# Patient Record
Sex: MALE | Race: WHITE | Employment: FULL TIME | URBAN - METROPOLITAN AREA
[De-identification: names, ages, dates, MRNs, and addresses within clinical notes are randomized per-mention and may not be internally consistent; named-entity substitution may affect disease eponyms.]

---

## 2019-01-28 ENCOUNTER — HOSPITAL ENCOUNTER (OUTPATIENT)
Dept: PHYSICAL THERAPY | Age: 54
Discharge: HOME | End: 2019-01-28
Payer: COMMERCIAL

## 2019-01-28 PROCEDURE — 97110 THERAPEUTIC EXERCISES: CPT

## 2019-01-28 PROCEDURE — 97162 PT EVAL MOD COMPLEX 30 MIN: CPT

## 2019-01-28 PROCEDURE — 97140 MANUAL THERAPY 1/> REGIONS: CPT

## 2019-01-28 NOTE — PROGRESS NOTES
[facility] [address] [facility] [place], [address] [place]  
[phone] fax Plan of Care/ Statement of Necessity for Physical Therapy Services Patient name: Russ Anglin Start of Care: 2019 Referral source: Carollee Ahumada, MD : 1965 Medical Diagnosis: Lumbar pain [M54.5] Payor: Bj Culp / Plan: [address] [place] [address] [place] PPO / Product Type: PPO /  Onset Date:chronic, last flare up May 2018 Treatment Diagnosis: left lumbar radiculopathy with impaired sciatic nerve mobility Prior Hospitalization: see medical history Provider#: [id number] Medications: Verified on Patient summary List  
 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 Assessment/ key information:  
 Pt is a 49 yo male with complaints of left sided back pain accompanied with LE radicular symptoms. Notes a chronic history of back pain with the most recent flare up occurring back in may of 2018. Notes he has tried chiropractic  and massage care following initial injury and reports only short term relief. Notes he went to his Dr in December where he received a cortisone shot with no ease in symptoms. Pt notes he initially injured his back 15 years ago while lifting a heavy box while twisting his spine to place the object on a shelf. Notes since then he has been getting flare ups a few times a year. Pt notes his pain normally goes away on it's own however after this last flare up he has been unable to ease the pain. Notes pain is constant, dull, achy with episodes of sharp pain with unspecified spinal motions.  Notes pain increases with bending forward, prolonged sitting, standing, prolonged walking, lifting, and also reports increased pain with coughing and sneezing. Pt notes a reproduction of left posterior LE radicular symptoms when his back is bothering him more. Pt notes pain is eased some with over the counter pain medication, stretching and when using heat. Pt denies any B LE N/T, LOB during ambulation, weakness/heaviness in legs or any changes in bowl/bladder function. Pt presents with S/S consistent  left lumbar radiculopathy accompanied by impaired sciatic nerve mobility. Upon examination pt presents with decreased left sciatic nerve mobility, decreased lumbar AROM into extension and left rotation with a reproduction of pain and LE radicular symptoms, fair core stabilization/control, and myofascial restrictions of B LE musculature ( left> right). Due to impairments listed above pt is having difficulty performing all household and community ADLs. . Patient will continue to benefit from skilled PT services to modify and progress therapeutic interventions, address functional mobility deficits, address ROM deficits, address strength deficits, analyze and address soft tissue restrictions, analyze and cue movement patterns, analyze and modify body mechanics/ergonomics, assess and modify postural abnormalities and instruct in home and community integration to attain remaining goals Physical Therapy Evaluation - Lumbar Spine ([facility]) General Health:  Red Flags Indicated? [] Yes    [x] No 
[] Yes     [x] No       Recent weight change (If yes, due to dieting? [] Yes  [] No) [] Yes     [x] No       Weakness in legs during walking 
[] Yes     [x] No       Unremitting pain at night 
[] Yes     [x] No       Abdominal pain or problems 
[] Yes     [x] No       Rectal bleeding 
[] Yes     [x] No       Feet more cold or painful in cold weather 
[] Yes     [x] No       Menstrual irregularities 
[] Yes     [x] No       Blood or pain with urination 
[] Yes     [x] No       Dysfunction of bowel or bladder [] Yes     [x] No       Recent illness within past 3 weeks (i.e, cold, flu) 
[] Yes     [x] No       Numbness/tingling in buttock/genitalia region 
  
Past History/Treatments:  
  
Diagnostic Tests:      [] Lab work        [x] X-rays    [] CT   [] MRI     [] Other[de-identified] 
  
OBJECTIVE Posture: 
Lateral Shift:    [] R    [] L             [] +  [] - 
Kyphosis:        [x] Increased       [] Decreased   []  WNL Lordosis:         [] Increased       [x] Decreased   [] WNL Pelvic symmetry: [] WNL          [] Other: 
  
Gait:                [x] Normal           [] Abnormal: 
  
Active Movements: [] N/A   [] Too acute   [] Other: ROM % AROM % PROM Comments:pain, area Forward flexion 40-60 100   Eases pain Extension 20-30 50   Reproduction of lower back pain. Reproduction of left LE radicular symptoms SB right 20-30 100   NE  
SB left 20-30 100   NE Rotation right 5-10 100   NE Rotation left 5-10 60   Reproduction of lower back pain. Reproduction of left LE radicular symptoms  
  
Repeated Movements Effects on present pain: produces (GA), abolishes (A), increases (incr), decreases (decr), centralizes (C), peripheral (PH), no effect (NE) 
  Pre-Test Sx Flexion Repeated Flexion Extension Repeated Extension Repeated SBL Repeated SBR Sitting                
Standing     C   PH      
Lying           N/A N/A  
  
Thoracic spine: hypomobile grossly 50% and decreased joint mobility noted Neuro Screen [x] WNL Myotome/Dermatome/Reflexes: 
Comments: 
  
Dural Mobility: SLR Sitting:     [x] R    [x] L    [] +    [x] -  @ (degrees):  
        Supine:    [x] R    [x] L    [] +    [x] -  @ (degrees):  
Slump Test:     [] R    [x] L    [x] +    [] -  @ (degrees): Prone Knee Bend:      [x] R    [x] L    [] +    [x] -  
  
Palpation [] Min  [x] Mod  [] Severe    Location: B lumbar paraspinals 
  
  
Strength 
  L(0-5) R (0-5) N/T Hip Flexion (L1,2) 5 5 []   
Knee Extension (L3,4) 5 5 []   
Ankle Dorsiflexion (L4) 5 5 []   
 Great Toe Extension (L5)     [x]   
Ankle Plantarflexion (S1)     []   
Knee Flexion (S1,2) 5 5 []   
Upper Abdominals     []   
Lower Abdominals     []   
Paraspinals     []   
Back Rotators     []   
Gluteus Nicolás 4- 4- []   
Other: Hip abduction ( TFL compression ) 4 4 []   
  
Special Tests Lumbar:          Lumb. Compression:   [x] Pos  [] Neg                                            
                        Lumbar Distraction:     [] Pos  [x] Neg 
                        Quadrant:                    [] Pos  [] Neg   [] Flex  [x] Ext - 
  
      
  
     Deficits:     Geno's: [] R    [] L    [] +    [x] - Bandar:          [] R    [] L    [] +    [x] - Hamstrings 90/90:moderate restriction B Gastrocsoleus (to neutral): Right: moderate restriction         Left:moderate restriction Global Muscular Weakness: 
Core control: fair: lost PPT at third rep of double leg lowering. 
  
 
 
 
Evaluation Complexity History MEDIUM  Complexity : 1-2 comorbidities / personal factors will impact the outcome/ POC ; Examination MEDIUM Complexity : 3 Standardized tests and measures addressing body structure, function, activity limitation and / or participation in recreation  ;Presentation MEDIUM Complexity : Evolving with changing characteristics  ; Clinical Decision Making MEDIUM Complexity : FOTO score of 26-74 Overall Complexity Rating: MEDIUM Problem List: pain affecting function, decrease ROM, decrease strength, decrease ADL/ functional abilitiies, decrease activity tolerance and decrease flexibility/ joint mobility Treatment Plan may include any combination of the following: Therapeutic exercise, Therapeutic activities, Neuromuscular re-education, Physical agent/modality, Manual therapy, Patient education, Self Care training and Functional mobility training Patient / Family readiness to learn indicated by: asking questions Persons(s) to be included in education: patient (P) Barriers to Learning/Limitations: None Patient Goal (s): decreae pain Patient Self Reported Health Status: good Rehabilitation Potential: good Short term goals to be achieved in 1 week 1) Pt will report full compliance with HEP in order to better self manage pain at home 
  
Long term goals to be achieved in 6 weeks 1) Pt will demonstrate with increased lumbar extension and left rotation to >/= WNL to increase ease with lifting. 2) Pt will demonstrate with an increase in core stability by being able to maintain a PPT while performing double leg lowering for 10 reps. 3) Pt will demonstrate an increase in B glute strength to >/=4+/5 to increase ease with community ambulation. 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 Frequency / Duration: Patient to be seen 2 times per week for 6 weeks. Patient/ Caregiver education and instruction: Diagnosis, prognosis, self care, activity modification and exercises 
 [x]  Plan of care has been reviewed with YOVANI Low 1/28/2019 2:16 PM 
________________________________________________________________________ I certify that the above Therapy Services are being furnished while the patient is under my care. I agree with the treatment plan and certify that this therapy is necessary. [de-identified] Signature:____________Date:_________TIME:________ 
 
[employer], Date and Time must be completed for valid certification ** Please sign and return to In [facility] [address] [facility], [address] [place]  
[phone] fax

## 2019-01-28 NOTE — PROGRESS NOTES
PT DAILY TREATMENT NOTE/LUMBAR EVAL 10-18 Patient Name: Peg Davidson Date:2019 : 1965 [x]  Patient  Verified Payor: Rosio Wakefield / [place]: [place] [employer] [place] / Product Type: PPO / In time:1200  Out time:100 Total Treatment Time (min): 60 Visit #: 1 of 12 Treatment Area: Lumbar pain [M54.5] SUBJECTIVE Pain Level (0-10 scale): 2 [x]constant []intermittent [x]improving []worsening []no change since onset Any medication changes, allergies to medications, adverse drug reactions, diagnosis change, or new procedure performed?: [x] No    [] Yes (see summary sheet for update) Subjective functional status/changes:    
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 
 
 
20 min [x]Eval                  []Re-Eval  
 
 
30 min Therapeutic Exercise:  [x] See flow sheet :  
Rationale: increase ROM, increase strength and improve coordination to improve the patients ability to increase ease ease ADls 10 min Manual Therapy:  Lumbar traction, manuals HS stretch Rationale: decrease pain, increase ROM and increase tissue extensibility to increase ease ease with work tasks With 
 [] TE 
 [] TA 
 [] neuro 
 [] other: Patient Education: [x] Review HEP [] Progressed/Changed HEP based on:  
[] positioning   [] body mechanics   [] transfers   [] heat/ice application   
[] other:   
 
 
 
Physical Therapy Evaluation - Lumbar Spine ([facility]) General Health:  Red Flags Indicated? [] Yes    [x] No 
[] Yes [x] No Recent weight change (If yes, due to dieting? [] Yes  [] No) [] Yes [x] No Weakness in legs during walking 
[] Yes [x] No Unremitting pain at night 
[] Yes [x] No Abdominal pain or problems 
[] Yes [x] No Rectal bleeding [] Yes [x] No Feet more cold or painful in cold weather 
[] Yes [x] No Menstrual irregularities 
[] Yes [x] No Blood or pain with urination 
[] Yes [x] No Dysfunction of bowel or bladder 
[] Yes [x] No Recent illness within past 3 weeks (i.e, cold, flu) 
[] Yes [x] No Numbness/tingling in buttock/genitalia region Past History/Treatments:  
 
Diagnostic Tests: [] Lab work [x] X-rays    [] CT [] MRI     [] Other[de-identified] 
 
OBJECTIVEPosture: 
Lateral Shift: [] R    [] L     [] +  [] - 
Kyphosis: [x] Increased [] Decreased   []  WNL Lordosis:  [] Increased [x] Decreased   [] WNL Pelvic symmetry: [] WNL    [] Other: 
 
Gait:  [x] Normal     [] Abnormal: 
 
Active Movements: [] N/A   [] Too acute   [] Other: ROM % AROM % PROM Comments:pain, area Forward flexion 40-60 100  Eases pain Extension 20-30 50  Reproduction of lower back pain. Reproduction of left LE radicular symptoms SB right 20-30 100  NE  
SB left 20-30 100  NE Rotation right 5-10 100  NE Rotation left 5-10 60  Reproduction of lower back pain. Reproduction of left LE radicular symptoms Repeated Movements Effects on present pain: produces (ID), abolishes (A), increases (incr), decreases (decr), centralizes (C), peripheral (PH), no effect (NE) Pre-Test Sx Flexion Repeated Flexion Extension Repeated Extension Repeated SBL Repeated SBR Sitting Standing   C  PH Lying      N/A N/A Thoracic spine: hypomobile grossly 50% and decreased joint mobility noted Neuro Screen [x] WNL Myotome/Dermatome/Reflexes: 
Comments: 
 
Dural Mobility: SLR Sitting: [x] R    [x] L    [] +    [x] -  @ (degrees):  
        Supine: [x] R    [x] L    [] +    [x] -  @ (degrees):  
Slump Test: [] R    [x] L    [x] +    [] -  @ (degrees): Prone Knee Bend: [x] R    [x] L    [] +    [x] - Palpation [] Min  [x] Mod  [] Severe    Location: B lumbar paraspinals Strength 
 L(0-5) R (0-5) N/T Hip Flexion (L1,2) 5 5 [] Knee Extension (L3,4) 5 5 [] Ankle Dorsiflexion (L4) 5 5 [] Great Toe Extension (L5)   [x] Ankle Plantarflexion (S1)   [] Knee Flexion (S1,2) 5 5 [] Upper Abdominals   [] Lower Abdominals   [] Paraspinals   [] Back Rotators   [] Gluteus Nicolás 4- 4- [] Other: Hip abduction ( TFL compression ) 4 4 [] Special Tests Lumbar:  Lumb. Compression: [x] Pos  [] Neg            
  Lumbar Distraction:   [] Pos  [x] Neg 
  Quadrant:  [] Pos  [] Neg   [] Flex  [x] Ext 
- Deficits: Geno's: [] R    [] L    [] +    [x] - Bandar: [] R    [] L    [] +    [x] - Hamstrings 90/90:moderate restriction B Gastrocsoleus (to neutral): Right: moderate restriction  Left:moderate restriction Global Muscular Weakness: 
Core control: fair: lost PPT at third rep of double leg lowering. Other tests/comments: 
  
 
Pain Level (0-10 scale) post treatment: 0 
 
ASSESSMENT/Changes in Function: Pt is a 47 yo male with complaints of left sided back pain accompanied with LE radicular symptoms. Notes a chronic history of back pain with the most recent flare up occurring back in may of 2018. Notes he has tried chiropractic  and massage care following initial injury and reports only short term relief. Notes he went to his Dr in December where he received a cortisone shot with no ease in symptoms. Pt notes he initially injured his back 15 years ago while lifting a heavy box while twisting his spine to place the object on a shelf. Notes since then he has been getting flare ups a few times a year. Pt notes his pain normally goes away on it's own however after this last flare up he has been unable to ease the pain. Notes pain is constant, dull, achy with episodes of sharp pain with unspecified spinal motions.  Notes pain increases with bending forward, prolonged sitting, standing, prolonged walking, lifting, and also reports increased pain with coughing and sneezing. Pt notes a reproduction of left posterior LE radicular symptoms when his back is bothering him more. Pt notes pain is eased some with over the counter pain medication, stretching and when using heat. Pt denies any B LE N/T, LOB during ambulation, weakness/heaviness in legs or any changes in bowl/bladder function. Pt presents with S/S consistent  left lumbar radiculopathy accompanied by impaired sciatic nerve mobility. Upon examination pt presents with decreased left sciatic nerve mobility, decreased lumbar AROM into extension and left rotation with a reproduction of pain and LE radicular symptoms, fair core stabilization/control, and myofascial restrictions of B LE musculature ( left> right). Due to impairments listed above pt is having difficulty performing all household and community ADLs. . Patient will continue to benefit from skilled PT services to modify and progress therapeutic interventions, address functional mobility deficits, address ROM deficits, address strength deficits, analyze and address soft tissue restrictions, analyze and cue movement patterns, analyze and modify body mechanics/ergonomics, assess and modify postural abnormalities and instruct in home and community integration to attain remaining goals. [x]  See Plan of Care 
[]  See progress note/recertification 
[]  See Discharge Summary Progress towards goals / Updated goals: 
Short term goals to be achieved in 1 week 1) Pt will report full compliance with HEP in order to better self manage pain at home At [facility]: reviewed and handed out HEP Long term goals to be achieved in 6 weeks 1) Pt will demonstrate with increased lumbar extension and left rotation to >/= WNL to increase ease with lifting. At [facility]: extension= 50%, left rotation= 60% 2) Pt will demonstrate with an increase in core stability by being able to maintain a PPT while performing double leg lowering for 10 reps. At [facility] lost form during third rep. 3) Pt will demonstrate an increase in B glute strength to >/=4+/5 to increase ease with community ambulation. At eval: B glute strength: 4-/5, B hip abduction strength 4/5   
4) Pt will have an improved FOTO score to >/=67 to increase ease function At eval:FOTO=56  
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 
[]  Update interventions per flow sheet      
[]  Discharge due to:_ 
[]  Other:_ Tamiko Low 1/28/2019  12:01 PM

## 2019-01-29 ENCOUNTER — HOSPITAL ENCOUNTER (OUTPATIENT)
Dept: PHYSICAL THERAPY | Age: 54
End: 2019-01-29
Payer: COMMERCIAL

## 2019-01-30 ENCOUNTER — HOSPITAL ENCOUNTER (OUTPATIENT)
Dept: PHYSICAL THERAPY | Age: 54
Discharge: HOME | End: 2019-01-30
Payer: COMMERCIAL

## 2019-01-30 PROCEDURE — 97140 MANUAL THERAPY 1/> REGIONS: CPT

## 2019-01-30 PROCEDURE — 97110 THERAPEUTIC EXERCISES: CPT

## 2019-01-30 NOTE — PROGRESS NOTES
PT DAILY TREATMENT NOTE 10-18 Patient Name: Raissa Toledo Date:2019 : 1965 [x]  Patient  Verified Payor: Melecio Macias / [place] [employer] [place] / Product Type: PPO / In time: 452 Out time:542 Total Treatment Time (min): 50 Visit #: 2 of 12 Treatment Area: Lumbar pain [M54.5] SUBJECTIVE Pain Level (0-10 scale): 1 Any medication changes, allergies to medications, adverse drug reactions, diagnosis change, or new procedure performed?: [x] No    [] Yes (see summary sheet for update) Subjective functional status/changes:   [] No changes reported Pt notes minimal pain today. Reports initiation of HEP OBJECTIVE 44 min Therapeutic Exercise:  [x] See flow sheet :  
Rationale: increase ROM and increase strength to improve the patients ability to increase ease with community mobility 8 min Manual Therapy:  Lumbar traction, manual HS stretch Rationale: decrease pain, increase ROM and increase tissue extensibility to increase ease with ADls With 
 [] TE 
 [] TA 
 [] neuro 
 [] other: Patient Education: [x] Review HEP [] Progressed/Changed HEP based on:  
[] positioning   [] body mechanics   [] transfers   [] heat/ice application   
[] other:   
 
  
 
Pain Level (0-10 scale) post treatment: 0 
 
ASSESSMENT/Changes in Function: Initiated exercises per POC. Pt reports no pain at the end of today's treatment. Patient will continue to benefit from skilled PT services to modify and progress therapeutic interventions, address functional mobility deficits, address ROM deficits, address strength deficits, analyze and address soft tissue restrictions, analyze and cue movement patterns, analyze and modify body mechanics/ergonomics, assess and modify postural abnormalities and instruct in home and community integration to attain remaining goals. [x]  See Plan of Care 
[]  See progress note/recertification 
[]  See Discharge Summary Progress towards goals / Updated goals: 
Short term goals to be achieved in 1 week 1) Pt will report full compliance with HEP in order to better self manage pain at home At eval: reviewed and handed out HEP 
  
Long term goals to be achieved in 6 weeks 1) Pt will demonstrate with increased lumbar extension and left rotation to >/= WNL to increase ease with lifting. At eval: extension= 50%, left rotation= 60% 2) Pt will demonstrate with an increase in core stability by being able to maintain a PPT while performing double leg lowering for 10 reps. At eval lost form during third rep. 3) Pt will demonstrate an increase in B glute strength to >/=4+/5 to increase ease with community ambulation. At [facility]: B glute strength: 4-/5, B hip abduction strength 4/5   
4) Pt will have an improved FOTO score to >/=67 to increase ease function At [facility]:FOTO=56  
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 
[]  Update interventions per flow sheet      
[]  Discharge due to:_ 
[]  Other:_ Monet Campos 1/30/2019  5:15 PM 
 
Future Appointments Date Time Provider Daylin Sherwood 2/5/2019  6:00 PM Olu Rai PTA [facility]  
2/6/2019  6:00 PM Anna Low [facility]  
2/12/2019  6:00 PM Anna Low [facility]  
2/13/2019  6:00 PM Anna Low [facility]  
2/19/2019  6:00 PM Anna Low [facility]  
2/21/2019  5:00 PM Baldo Armstrong [facility]  
2/25/2019  5:30 PM [facility]  
2/27/2019  6:00 PM Anna Low [facility]  
3/4/2019  6:00 PM Tamiko Low [facility]  
3/6/2019  6:00 PM [facility]

## 2019-02-05 ENCOUNTER — HOSPITAL ENCOUNTER (OUTPATIENT)
Dept: PHYSICAL THERAPY | Age: 54
Discharge: HOME | End: 2019-02-05
Payer: COMMERCIAL

## 2019-02-05 PROCEDURE — 97110 THERAPEUTIC EXERCISES: CPT

## 2019-02-05 PROCEDURE — 97140 MANUAL THERAPY 1/> REGIONS: CPT

## 2019-02-05 NOTE — PROGRESS NOTES
PT DAILY TREATMENT NOTE 10-18 Patient Name: Mathew Paiz Date:2019 : 1965 [x]  Patient  Verified Payor: Sahra Nazario / [place]: [place] [employer] / Product Type: PPO / In time:540  Out time:630 Total Treatment Time (min): 50 Visit #: 3 of 12 Treatment Area: Lumbar pain [M54.5] SUBJECTIVE Pain Level (0-10 scale): 0-1 Any medication changes, allergies to medications, adverse drug reactions, diagnosis change, or new procedure performed?: [x] No    [] Yes (see summary sheet for update) Subjective functional status/changes:   [] No changes reported Pt reports minimal pain today. Has no new complaints at this time OBJECTIVE 42 min Therapeutic Exercise:  [x] See flow sheet :  
Rationale: increase ROM, increase strength and improve coordination to improve the patients ability to increase ease with functional tasks 8 min Manual Therapy:  Lumbar traction, manual HS stretch Rationale: decrease pain and increase ROM to increase ease with ADLs With 
 [] TE 
 [] TA 
 [] neuro 
 [] other: Patient Education: [x] Review HEP [] Progressed/Changed HEP based on:  
[] positioning   [] body mechanics   [] transfers   [] heat/ice application   
[] other:   
 
  
 
Pain Level (0-10 scale) post treatment: 0 
 
ASSESSMENT/Changes in Function: Progressed pt in more core strengthening and stability exercises . Pt had no complaints of increased pain. Patient will continue to benefit from skilled PT services to modify and progress therapeutic interventions, address functional mobility deficits, address ROM deficits, address strength deficits, analyze and address soft tissue restrictions, analyze and cue movement patterns, analyze and modify body mechanics/ergonomics, assess and modify postural abnormalities, address imbalance/dizziness and instruct in home and community integration to attain remaining goals. [x]  See Plan of Care 
[]  See progress note/recertification []  See Discharge Summary Progress towards goals / Updated goals: 
Short term goals to be achieved in 1 week 1) Pt will report full compliance with HEP in order to better self manage pain at home At [facility]: reviewed and handed out HEP Current: Progressing, reports initiation of HEP, does not report full compliance, 2/4/19 
  
Long term goals to be achieved in 6 weeks 1) Pt will demonstrate with increased lumbar extension and left rotation to >/= WNL to increase ease with lifting. At eval: extension= 50%, left rotation= 60% 2) Pt will demonstrate with an increase in core stability by being able to maintain a PPT while performing double leg lowering for 10 reps. At eval lost form during third rep. 3) Pt will demonstrate an increase in B glute strength to >/=4+/5 to increase ease with community ambulation. At [facility]: B glute strength: 4-/5, B hip abduction strength 4/5   
4) Pt will have an improved FOTO score to >/=67 to increase ease function At [facility]:FOTO=56  
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 
[]  Update interventions per flow sheet      
[]  Discharge due to:_ 
[]  Other:_ Irene Rolling 2/5/2019  5:41 PM 
 
Future Appointments Date Time Provider Daylin Sherwood 2/5/2019  6:00 PM Jaja Low [facility]  
2/6/2019  6:00 PM Tahira Low [facility]  
2/12/2019  6:00 PM Tahira Low [facility]  
2/13/2019  6:00 PM Tahira Low [facility]  
2/19/2019  6:00 PM Magnus Killian [facility]  
2/21/2019  5:00 PM Dimitri Contreras PT [facility]  
2/25/2019  5:30 PM Magnus Killian [facility]  
2/27/2019  6:00 PM Magnus Killian [facility]  
3/4/2019  6:00 PM Shima Low [facility]  
 3/6/2019  6:00 PM Beau Ladd [facility]

## 2019-02-06 ENCOUNTER — HOSPITAL ENCOUNTER (OUTPATIENT)
Dept: PHYSICAL THERAPY | Age: 54
Discharge: HOME | End: 2019-02-06
Payer: COMMERCIAL

## 2019-02-06 PROCEDURE — 97112 NEUROMUSCULAR REEDUCATION: CPT

## 2019-02-06 PROCEDURE — 97110 THERAPEUTIC EXERCISES: CPT

## 2019-02-06 NOTE — PROGRESS NOTES
PT DAILY TREATMENT NOTE 10-18 Patient Name: Jd Wells Date:2019 : 1965 [x]  Patient  Verified Payor: Kyle Backer / [place]: [place] [employer] [place] / Product Type: PPO / In time:530 Out time:630 Total Treatment Time (min): 60 Visit #: 4 of 12 Treatment Area: Lumbar pain [M54.5] SUBJECTIVE Pain Level (0-10 scale): 0.5 Any medication changes, allergies to medications, adverse drug reactions, diagnosis change, or new procedure performed?: [x] No    [] Yes (see summary sheet for update) Subjective functional status/changes:   [] No changes reported Pt reports that he continues to feel good. OBJECTIVE 20 min Therapeutic Exercise:  [x] See flow sheet :  
Rationale: increase ROM and increase strength to improve the patients ability to increase ease with  
  
40 min Neuromuscular Re-education:  [x]  See flow sheet :core activation exercises Rationale: increase strength, improve coordination and increase proprioception  to improve the patients ability to increase ease with recreational tasks With 
 [] TE 
 [] TA 
 [] neuro 
 [] other: Patient Education: [x] Review HEP [] Progressed/Changed HEP based on:  
[] positioning   [] body mechanics   [] transfers   [] heat/ice application   
[] other:   
 
  
 
Pain Level (0-10 scale) post treatment: 0 
 
ASSESSMENT/Changes in Function: Progressed pt to front and side planks at table. Pt continues to demonstrate with improved core stability and lumbo pelvic awareness. Patient will continue to benefit from skilled PT services to modify and progress therapeutic interventions, address functional mobility deficits, address ROM deficits, address strength deficits, analyze and address soft tissue restrictions, analyze and cue movement patterns, analyze and modify body mechanics/ergonomics, assess and modify postural abnormalities and instruct in home and community integration to attain remaining goals. [x]  See Plan of Care []  See progress note/recertification 
[]  See Discharge Summary Progress towards goals / Updated goals: 
Short term goals to be achieved in 1 week 1) Pt will report full compliance with HEP in order to better self manage pain at home At [facility]: reviewed and handed out HEP Current: Progressing, reports initiation of HEP, does not report full compliance, 2/4/19 
  
Long term goals to be achieved in 6 weeks 1) Pt will demonstrate with increased lumbar extension and left rotation to >/= WNL to increase ease with lifting. At [facility]: extension= 50%, left rotation= 60% 2) Pt will demonstrate with an increase in core stability by being able to maintain a PPT while performing double leg lowering for 10 reps. At [facility] lost form during third rep. 3) Pt will demonstrate an increase in B glute strength to >/=4+/5 to increase ease with community ambulation. At [facility]: B glute strength: 4-/5, B hip abduction strength 4/5   
4) Pt will have an improved FOTO score to >/=67 to increase ease function At [facility]:FOTO=56  
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 
[]  Update interventions per flow sheet      
[]  Discharge due to:_ 
[]  Other:_ Sincere Aguilar 2/6/2019  5:40 PM 
 
Future Appointments Date Time Provider Daylin Sherwood 2/6/2019  6:00 PM Jaja Low [facility]  
2/12/2019  6:00 PM Mino Low Delay [facility]  
2/13/2019  6:00 PM Mino Low [facility]  
2/19/2019  6:00 PM Abelardo Nixon [facility]  
2/21/2019  5:00 PM Duy Crain PT [facility]  
2[facility]25/2019  [facility]  
2/27/2019  6:00 PM Abelardo Nixon [facility]  
3/4/2019  6:00 PM Maranda [facility]  
 3/6/2019  6:00 PM Marianna Goldman [facility]

## 2019-02-12 ENCOUNTER — APPOINTMENT (OUTPATIENT)
Dept: PHYSICAL THERAPY | Age: 54
End: 2019-02-12
Payer: COMMERCIAL

## 2019-02-13 ENCOUNTER — HOSPITAL ENCOUNTER (OUTPATIENT)
Dept: PHYSICAL THERAPY | Age: 54
Discharge: HOME | End: 2019-02-13
Payer: COMMERCIAL

## 2019-02-13 PROCEDURE — 97110 THERAPEUTIC EXERCISES: CPT

## 2019-02-13 PROCEDURE — 97112 NEUROMUSCULAR REEDUCATION: CPT

## 2019-02-13 NOTE — PROGRESS NOTES
PT DAILY TREATMENT NOTE 10-18 Patient Name: Justina Lab Date:2019 : 1965 [x]  Patient  Verified Payor: Rachid Cardenas / Plan: [place] [employer] [place] / Product Type: PPO / In time:530 Out time:526 Total Treatment Time (min): 56 Visit #: 5 of 12 Treatment Area: Lumbar pain [M54.5] SUBJECTIVE Pain Level (0-10 scale): 1 Any medication changes, allergies to medications, adverse drug reactions, diagnosis change, or new procedure performed?: [x] No    [] Yes (see summary sheet for update) Subjective functional status/changes:   [] No changes reported Pt reports minimal pain today. OBJECTIVE 26 min Therapeutic Exercise:  [x] See flow sheet :  
Rationale: increase ROM and increase strength to improve the patients ability to increase ease with ADls 30 min Neuromuscular Re-education:  [x]  See flow sheet :core stability exercises Rationale: increase strength, improve coordination and increase proprioception  to improve the patients ability to increase ease with work tasks With 
 [] TE 
 [] TA 
 [] neuro 
 [] other: Patient Education: [x] Review HEP [] Progressed/Changed HEP based on:  
[] positioning   [] body mechanics   [] transfers   [] heat/ice application   
[] other:   
 
  
 
Pain Level (0-10 scale) post treatment: 0 
 
ASSESSMENT/Changes in Function: Pt continues to demonstrate with good tolerance to activity. Pt is being decreased to being seen 1 time a week. Patient will continue to benefit from skilled PT services to modify and progress therapeutic interventions, address functional mobility deficits, address ROM deficits, address strength deficits, analyze and address soft tissue restrictions, analyze and cue movement patterns, analyze and modify body mechanics/ergonomics, assess and modify postural abnormalities and instruct in home and community integration to attain remaining goals. [x]  See Plan of Care 
[]  See progress note/recertification []  See Discharge Summary Progress towards goals / Updated goals: 
Short term goals to be achieved in 1 week 1) Pt will report full compliance with HEP in order to better self manage pain at home At [facility]: reviewed and handed out HEP  
Current: Progressing, reports initiation of HEP, does not report full compliance, 2/4/19 
  
Long term goals to be achieved in 6 weeks 1) Pt will demonstrate with increased lumbar extension and left rotation to >/= WNL to increase ease with lifting. At eval: extension= 50%, left rotation= 60% 2) Pt will demonstrate with an increase in core stability by being able to maintain a PPT while performing double leg lowering for 10 reps. At eval lost form during third rep. 3) Pt will demonstrate an increase in B glute strength to >/=4+/5 to increase ease with community ambulation. At [facility]: B glute strength: 4-/5, B hip abduction strength 4/5   
4) Pt will have an improved FOTO score to >/=67 to increase ease function At [facility]:FOTO=56  
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 
[]  Update interventions per flow sheet      
[]  Discharge due to:_ 
[]  Other:_ Tino Waters 2/13/2019  5:37 PM 
 
Future Appointments Date Time Provider Daylin Sherwood 2/13/2019  6:00 PM Jaja Low [facility]  
2/19/2019  6:00 PM Geovanna Low [facility]  
2/27/2019  6:00 PM [facility]  
3/6/2019  6:00 PM Geovanna Low [facility]  
3/13/2019  6:00 PM Geovanna Low [facility]  
3/20/2019  6:00 PM Geovanna Low [facility]  
3/27/2019  6:00 PM Jaja Low [facility]  
4/3/2019  6:00 PM Yancy Low [facility]

## 2019-02-19 ENCOUNTER — HOSPITAL ENCOUNTER (OUTPATIENT)
Dept: PHYSICAL THERAPY | Age: 54
Discharge: HOME | End: 2019-02-19
Payer: COMMERCIAL

## 2019-02-19 PROCEDURE — 97110 THERAPEUTIC EXERCISES: CPT

## 2019-02-19 PROCEDURE — 97112 NEUROMUSCULAR REEDUCATION: CPT

## 2019-02-19 NOTE — PROGRESS NOTES
PT DAILY TREATMENT NOTE 10-18 Patient Name: Mario Bello Date:2019 : 1965 [x]  Patient  Verified Payor: Hiren Johnson / [place]: [place] [employer] [place] / Product Type: PPO / In time:558 Out time:648 Total Treatment Time (min): 50 Visit #: 6 of 12 Treatment Area: Lumbar pain [M54.5] SUBJECTIVE Pain Level (0-10 scale): 1 Any medication changes, allergies to medications, adverse drug reactions, diagnosis change, or new procedure performed?: [x] No    [] Yes (see summary sheet for update) Subjective functional status/changes:   [] No changes reported Pt reports minimal pain today OBJECTIVE 20 min Therapeutic Exercise:  [x] See flow sheet :  
Rationale: increase ROM and increase strength to improve the patients ability to increase ease with work tasks 30 min Neuromuscular Re-education:  [x]  See flow sheet :core strengthening and gluteal activation exercises Rationale: increase strength, improve coordination and increase proprioception  to improve the patients ability to increase ease with return to regular exercise routine. With 
 [] TE 
 [] TA 
 [] neuro 
 [] other: Patient Education: [x] Review HEP [] Progressed/Changed HEP based on:  
[] positioning   [] body mechanics   [] transfers   [] heat/ice application   
[] other:   
 
  
 
Pain Level (0-10 scale) post treatment: 0 
 
ASSESSMENT/Changes in Function: Pt progressed to single leg Kieser press. Pt continues with good tolerance to therapeutic exercise. Patient will continue to benefit from skilled PT services to modify and progress therapeutic interventions, address functional mobility deficits, address ROM deficits, address strength deficits, analyze and address soft tissue restrictions, analyze and cue movement patterns, analyze and modify body mechanics/ergonomics and instruct in home and community integration to attain remaining goals. [x]  See Plan of Care 
[]  See progress note/recertification []  See Discharge Summary Progress towards goals / Updated goals: 
Short term goals to be achieved in 1 week 1) Pt will report full compliance with HEP in order to better self manage pain at home At eval: reviewed and handed out HEP  
Current: Progressing, reports initiation of HEP, does not report full compliance, 2/4/19 
  
Long term goals to be achieved in 6 weeks 1) Pt will demonstrate with increased lumbar extension and left rotation to >/= WNL to increase ease with lifting. At eval: extension= 50%, left rotation= 60% 2) Pt will demonstrate with an increase in core stability by being able to maintain a PPT while performing double leg lowering for 10 reps. At eval lost form during third rep. 3) Pt will demonstrate an increase in B glute strength to >/=4+/5 to increase ease with community ambulation. At [facility]: B glute strength: 4-/5, B hip abduction strength 4/5   
4) Pt will have an improved FOTO score to >/=67 to increase ease function At [facility]:FOTO=56  
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 
 Current: MET: pt reports 75% improvement since [facility] 2/19/19 PLAN [x]  Upgrade activities as tolerated     [x]  Continue plan of care 
[]  Update interventions per flow sheet      
[]  Discharge due to:_ 
[]  Other:_ Sravan Morales 2/19/2019  6:24 PM 
 
Future Appointments Date Time Provider Daylin Sherwood 2/27/2019  6:00 PM Aaron [facility]  
3/6/2019  6:00 PM Linden Low [facility]  
3/13/2019  6:00 PM Linden Low [facility]  
3/20/2019  6:00 PM Linden Lowa [facility]  
3/27/2019  6:00 PM Linden Low [facility]  
4/3/2019  6:00 PM Aaron Low [facility]

## 2019-02-21 ENCOUNTER — APPOINTMENT (OUTPATIENT)
Dept: PHYSICAL THERAPY | Age: 54
End: 2019-02-21
Payer: COMMERCIAL

## 2019-02-25 ENCOUNTER — APPOINTMENT (OUTPATIENT)
Dept: PHYSICAL THERAPY | Age: 54
End: 2019-02-25
Payer: COMMERCIAL

## 2019-02-27 ENCOUNTER — APPOINTMENT (OUTPATIENT)
Dept: PHYSICAL THERAPY | Age: 54
End: 2019-02-27
Payer: COMMERCIAL

## 2019-03-04 ENCOUNTER — HOSPITAL ENCOUNTER (OUTPATIENT)
Dept: PHYSICAL THERAPY | Age: 54
End: 2019-03-04

## 2019-03-06 ENCOUNTER — APPOINTMENT (OUTPATIENT)
Dept: PHYSICAL THERAPY | Age: 54
End: 2019-03-06

## 2019-03-13 ENCOUNTER — APPOINTMENT (OUTPATIENT)
Dept: PHYSICAL THERAPY | Age: 54
End: 2019-03-13

## 2019-03-19 NOTE — PROGRESS NOTES
[facility] [address] [facility] [place] [place]  
[phone] fax Discharge Summary Patient name: Jasson Cruz Start of Care: 2019 Referral source: Mireille Velazquez MD : 1965 Medical/Treatment Diagnosis: Lumbar pain [M54.5] Payor: Haroldo Marrero / Plan: Duc Morrow PPO / Product Type: PPO /  Onset Date:chronic, last flare up May 2018 Prior Hospitalization: see medical history Provider#: [id number] Medications: Verified on Patient Summary List   
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 
Short term goals to be achieved in 1 week 1) Pt will report full compliance with HEP in order to better self manage pain at home At [facility]: reviewed and handed out HEP  
At [place]: MET reports full compliance   
  
Long term goals to be achieved in 6 weeks 1) Pt will demonstrate with increased lumbar extension and left rotation to >/= WNL to increase ease with lifting. At eval: extension= 50%, left rotation= 60% At [facility]: unable to assess due to pt non compliance 2) Pt will demonstrate with an increase in core stability by being able to maintain a PPT while performing double leg lowering for 10 reps. At eval lost form during third rep. At [facility]: unable to assess due to pt non compliance 3) Pt will demonstrate an increase in B glute strength to >/=4+/5 to increase ease with community ambulation. At eval: B glute strength: 4-/5, B hip abduction strength 4/5   
At [facility]: unable to assess due to pt non compliance 4) Pt will have an improved FOTO score to >/=67 to increase ease function At eval:FOTO=56  
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 
 Current: MET: pt reports 75% improvement since [facility]  
  
 
 
ASSESSMENT/RECOMMENDATIONS: Pt is being discharged at this time due reporting full compliance with HEP and no longer having any limitations. [x]Discontinue therapy: [x]Patient has reached or is progressing toward set goals []Patient is non-compliant or has abdicated 
    []Due to lack of appreciable progress towards set goals Red Low 3/19/2019 11:36 AM

## 2019-03-20 ENCOUNTER — APPOINTMENT (OUTPATIENT)
Dept: PHYSICAL THERAPY | Age: 54
End: 2019-03-20

## 2019-03-27 ENCOUNTER — APPOINTMENT (OUTPATIENT)
Dept: PHYSICAL THERAPY | Age: 54
End: 2019-03-27

## 2019-04-03 ENCOUNTER — APPOINTMENT (OUTPATIENT)
Dept: PHYSICAL THERAPY | Age: 54
End: 2019-04-03